# Patient Record
Sex: MALE | Race: BLACK OR AFRICAN AMERICAN | NOT HISPANIC OR LATINO | Employment: UNEMPLOYED | URBAN - METROPOLITAN AREA
[De-identification: names, ages, dates, MRNs, and addresses within clinical notes are randomized per-mention and may not be internally consistent; named-entity substitution may affect disease eponyms.]

---

## 2018-11-09 ENCOUNTER — HOSPITAL ENCOUNTER (EMERGENCY)
Facility: OTHER | Age: 48
Discharge: HOME | End: 2018-11-09
Attending: EMERGENCY MEDICINE
Payer: MEDICAID

## 2018-11-09 VITALS
RESPIRATION RATE: 15 BRPM | TEMPERATURE: 98 F | HEART RATE: 64 BPM | WEIGHT: 189 LBS | OXYGEN SATURATION: 98 % | DIASTOLIC BLOOD PRESSURE: 83 MMHG | BODY MASS INDEX: 29.66 KG/M2 | HEIGHT: 67 IN | SYSTOLIC BLOOD PRESSURE: 154 MMHG

## 2018-11-09 DIAGNOSIS — R07.9 CHEST PAIN: ICD-10-CM

## 2018-11-09 DIAGNOSIS — R06.6 INTRACTABLE HICCUPS: Primary | ICD-10-CM

## 2018-11-09 LAB
ALBUMIN SERPL BCP-MCNC: 3.8 G/DL
ALP SERPL-CCNC: 82 U/L
ALT SERPL W/O P-5'-P-CCNC: 22 U/L
ANION GAP SERPL CALC-SCNC: 12 MMOL/L
AST SERPL-CCNC: 38 U/L
BASOPHILS # BLD AUTO: 0.01 K/UL
BASOPHILS NFR BLD: 0.1 %
BILIRUB SERPL-MCNC: 0.4 MG/DL
BNP SERPL-MCNC: <10 PG/ML
BUN SERPL-MCNC: 8 MG/DL
CALCIUM SERPL-MCNC: 9.2 MG/DL
CHLORIDE SERPL-SCNC: 104 MMOL/L
CO2 SERPL-SCNC: 22 MMOL/L
CREAT SERPL-MCNC: 0.9 MG/DL
DIFFERENTIAL METHOD: ABNORMAL
EOSINOPHIL # BLD AUTO: 0.1 K/UL
EOSINOPHIL NFR BLD: 0.8 %
ERYTHROCYTE [DISTWIDTH] IN BLOOD BY AUTOMATED COUNT: 14.6 %
EST. GFR  (AFRICAN AMERICAN): >60 ML/MIN/1.73 M^2
EST. GFR  (NON AFRICAN AMERICAN): >60 ML/MIN/1.73 M^2
GLUCOSE SERPL-MCNC: 85 MG/DL
HCT VFR BLD AUTO: 42.7 %
HGB BLD-MCNC: 14.2 G/DL
LYMPHOCYTES # BLD AUTO: 1.6 K/UL
LYMPHOCYTES NFR BLD: 20.6 %
MCH RBC QN AUTO: 29.2 PG
MCHC RBC AUTO-ENTMCNC: 33.3 G/DL
MCV RBC AUTO: 88 FL
MONOCYTES # BLD AUTO: 1.1 K/UL
MONOCYTES NFR BLD: 13.3 %
NEUTROPHILS # BLD AUTO: 5.1 K/UL
NEUTROPHILS NFR BLD: 64.9 %
PLATELET # BLD AUTO: 206 K/UL
PMV BLD AUTO: 11.4 FL
POTASSIUM SERPL-SCNC: 3.7 MMOL/L
PROT SERPL-MCNC: 7.2 G/DL
RBC # BLD AUTO: 4.86 M/UL
SODIUM SERPL-SCNC: 138 MMOL/L
TROPONIN I SERPL DL<=0.01 NG/ML-MCNC: <0.006 NG/ML
WBC # BLD AUTO: 7.91 K/UL

## 2018-11-09 PROCEDURE — 96375 TX/PRO/DX INJ NEW DRUG ADDON: CPT

## 2018-11-09 PROCEDURE — 80053 COMPREHEN METABOLIC PANEL: CPT

## 2018-11-09 PROCEDURE — 84484 ASSAY OF TROPONIN QUANT: CPT

## 2018-11-09 PROCEDURE — 63600175 PHARM REV CODE 636 W HCPCS: Performed by: EMERGENCY MEDICINE

## 2018-11-09 PROCEDURE — 85025 COMPLETE CBC W/AUTO DIFF WBC: CPT

## 2018-11-09 PROCEDURE — 83880 ASSAY OF NATRIURETIC PEPTIDE: CPT

## 2018-11-09 PROCEDURE — 96374 THER/PROPH/DIAG INJ IV PUSH: CPT

## 2018-11-09 PROCEDURE — 93005 ELECTROCARDIOGRAM TRACING: CPT

## 2018-11-09 PROCEDURE — 99285 EMERGENCY DEPT VISIT HI MDM: CPT | Mod: 25

## 2018-11-09 PROCEDURE — 93010 ELECTROCARDIOGRAM REPORT: CPT | Mod: ,,, | Performed by: INTERNAL MEDICINE

## 2018-11-09 PROCEDURE — 25000003 PHARM REV CODE 250: Performed by: EMERGENCY MEDICINE

## 2018-11-09 RX ORDER — METOCLOPRAMIDE HYDROCHLORIDE 5 MG/ML
10 INJECTION INTRAMUSCULAR; INTRAVENOUS
Status: COMPLETED | OUTPATIENT
Start: 2018-11-09 | End: 2018-11-09

## 2018-11-09 RX ORDER — HYDRALAZINE HYDROCHLORIDE 100 MG/1
100 TABLET, FILM COATED ORAL 2 TIMES DAILY
COMMUNITY
End: 2021-08-23 | Stop reason: CLARIF

## 2018-11-09 RX ORDER — FENTANYL CITRATE 50 UG/ML
50 INJECTION, SOLUTION INTRAMUSCULAR; INTRAVENOUS
Status: COMPLETED | OUTPATIENT
Start: 2018-11-09 | End: 2018-11-09

## 2018-11-09 RX ORDER — METHOCARBAMOL 750 MG/1
500 TABLET, FILM COATED ORAL 4 TIMES DAILY
COMMUNITY
End: 2021-08-23 | Stop reason: CLARIF

## 2018-11-09 RX ORDER — CHLORPROMAZINE HYDROCHLORIDE 25 MG/1
25 TABLET, FILM COATED ORAL
Status: COMPLETED | OUTPATIENT
Start: 2018-11-09 | End: 2018-11-09

## 2018-11-09 RX ORDER — FUROSEMIDE 80 MG/1
80 TABLET ORAL 2 TIMES DAILY
COMMUNITY
End: 2021-08-23 | Stop reason: CLARIF

## 2018-11-09 RX ORDER — SPIRONOLACTONE 25 MG/1
25 TABLET ORAL DAILY
COMMUNITY
End: 2021-08-23 | Stop reason: CLARIF

## 2018-11-09 RX ORDER — ATORVASTATIN CALCIUM 40 MG/1
40 TABLET, FILM COATED ORAL DAILY
COMMUNITY
End: 2021-09-02 | Stop reason: SDUPTHER

## 2018-11-09 RX ORDER — FUROSEMIDE 40 MG/1
40 TABLET ORAL 2 TIMES DAILY
COMMUNITY
End: 2021-08-23 | Stop reason: CLARIF

## 2018-11-09 RX ORDER — ASPIRIN 81 MG/1
81 TABLET ORAL DAILY
COMMUNITY
End: 2021-08-23 | Stop reason: CLARIF

## 2018-11-09 RX ORDER — TRAMADOL HYDROCHLORIDE 50 MG/1
50 TABLET ORAL EVERY 6 HOURS
COMMUNITY
End: 2021-08-23 | Stop reason: CLARIF

## 2018-11-09 RX ORDER — CARVEDILOL 12.5 MG/1
12.5 TABLET ORAL 2 TIMES DAILY WITH MEALS
COMMUNITY
End: 2021-09-02 | Stop reason: SDUPTHER

## 2018-11-09 RX ORDER — BACLOFEN 10 MG/1
5 TABLET ORAL 2 TIMES DAILY PRN
Qty: 15 TABLET | Refills: 0 | Status: SHIPPED | OUTPATIENT
Start: 2018-11-09 | End: 2021-08-23 | Stop reason: CLARIF

## 2018-11-09 RX ADMIN — CHLORPROMAZINE HYDROCHLORIDE 25 MG: 25 TABLET, SUGAR COATED ORAL at 10:11

## 2018-11-09 RX ADMIN — BACLOFEN 5 MG: 10 TABLET ORAL at 10:11

## 2018-11-09 RX ADMIN — METOCLOPRAMIDE 10 MG: 5 INJECTION, SOLUTION INTRAMUSCULAR; INTRAVENOUS at 08:11

## 2018-11-09 RX ADMIN — FENTANYL CITRATE 50 MCG: 50 INJECTION, SOLUTION INTRAMUSCULAR; INTRAVENOUS at 08:11

## 2018-11-10 NOTE — ED TRIAGE NOTES
"Pt presents to the ed via [facility] with c/o cp x 6 hours PTA. Pt states, "I have been having chest hiccups for 5 days and the pain started today." Pt also reports n/v/ x 2. Pt has hx of CHF, denies sob, HA, dizziness, fever, chills. Pt attached to cardiac monitoring, continuous pulse ox, and BP cycling. Pt is aao, RR even and unlabored. VS stable. NAD noted. Will continue to monitor.  "

## 2018-11-10 NOTE — ED PROVIDER NOTES
"Encounter Date: 11/9/2018    SCRIBE #1 NOTE: I, Brittany Grier, vickie scribing for, and in the presence of, Dr. Mcgovern.       History     Chief Complaint   Patient presents with    Chest Pain     Pt came to the ED tonight c.o. chest pain x 6 hours. pt has a hx of chf htn.      Time seen by provider: 8:07 PM    This is a 48 y.o. male with hx of HTN, A-Fib, and CHF who presents via EMS with complaint of R sided chest pain for 6 hours. Pt was walking at onset. It is exacerbated with deep inspiration, movement, and ambulation. He also reports "non-stop" hiccups for the past 5 days. He had partial R lobectomy at [facility] 4 months ago for tumor. He reports intermittent chest pains and hiccups since, but not as severe or constant as current sx. He has had numbness to the R side of the torso since surgery. Of note, pt retained about 60lbs of fluid after surgery, which he has since lost with lasix medicine adjustment. He was given NTG spray and ASA by EMS en route here, which pt states helped sx. He denies any fever, chills, N/V/D, abdominal pain, leg swelling, palpitations, SOB, cough, wheezing, dizziness, weakness, lightheadedness, and back pain. Patient was in CT surgery clinic for routine follow-up today but did not want to wait for MD so left Clinic and reschedule appointment for next month.    Per chart review of [facility] records, pt was diagnosed with a R lower lobe neuroendocrine carcinoid tumor in 2015. He was then diagnosed with new onset CHF and LV thrombus earlier this year and started on Coumadin. Pt had R lower lobe lobectomy on 7/24/28 with small post-op R pleural effusion. He has had multiple ED visits for intractable R CP at [facility] since then despite taking tramadol.      The history is provided by the patient.     Review of patient's allergies indicates:   Allergen Reactions    Ace inhibitors      Past Medical History:   Diagnosis Date    CHF (congestive heart failure)     Hypertension      History reviewed. " No pertinent surgical history.  History reviewed. No pertinent family history.  Social History     Tobacco Use    Smoking status: Current Every Day Smoker     Types: Cigarettes   Substance Use Topics    Alcohol use: Yes    Drug use: No     Review of Systems   Constitutional: Positive for unexpected weight change. Negative for chills and fever.   HENT: Negative for congestion, rhinorrhea and sore throat.    Eyes: Negative for visual disturbance.   Respiratory: Negative for cough and shortness of breath.    Cardiovascular: Positive for chest pain. Negative for palpitations and leg swelling.   Gastrointestinal: Negative for abdominal pain, diarrhea, nausea and vomiting.        Positive for hiccups.   Endocrine: Negative for polyuria.   Genitourinary: Negative for decreased urine volume and dysuria.   Musculoskeletal: Negative for back pain.   Skin: Negative for rash.   Allergic/Immunologic: Negative for immunocompromised state.   Neurological: Positive for numbness. Negative for dizziness, syncope, weakness, light-headedness and headaches.   Hematological: Does not bruise/bleed easily.   Psychiatric/Behavioral: Negative for confusion.       Physical Exam     Initial Vitals [11/09/18 1940]   BP Pulse Resp Temp SpO2   129/66 96 20 97.8 °F (36.6 °C) 97 %      MAP       --         Physical Exam    Nursing note and vitals reviewed.  Constitutional: He appears well-developed and well-nourished. He is not diaphoretic. No distress.   HENT:   Head: Normocephalic and atraumatic.   Right Ear: External ear normal.   Left Ear: External ear normal.   Eyes: Right eye exhibits no discharge. Left eye exhibits no discharge.   Neck: Normal range of motion. Neck supple.   Cardiovascular: Normal rate, regular rhythm, normal heart sounds and intact distal pulses. Exam reveals no gallop and no friction rub.    No murmur heard.  Pulmonary/Chest: No respiratory distress. He has no wheezes. He has no rhonchi. He has no rales. He exhibits  tenderness.   Decreased breath sounds R lower lung. Reproducible chest wall tenderness.    Abdominal: Soft. Bowel sounds are normal. He exhibits no distension. There is no tenderness. There is no rebound and no guarding.   Musculoskeletal: Normal range of motion. He exhibits no edema or tenderness.   Lymphadenopathy:     He has no cervical adenopathy.   Neurological: He is alert and oriented to person, place, and time. He has normal strength. No sensory deficit.   Skin: Skin is warm and dry. No rash noted. No erythema.   Psychiatric: He has a normal mood and affect. His behavior is normal. Judgment and thought content normal.         ED Course   Procedures  Labs Reviewed   CBC W/ AUTO DIFFERENTIAL - Abnormal; Notable for the following components:       Result Value    RDW 14.6 (*)     Mono # 1.1 (*)     All other components within normal limits   COMPREHENSIVE METABOLIC PANEL - Abnormal; Notable for the following components:    CO2 22 (*)     All other components within normal limits   B-TYPE NATRIURETIC PEPTIDE   TROPONIN I     EKG Readings: (Independently Interpreted)   ST at a rate of 101. Non specific T wave abnormality. No STEMI.       Imaging Results          X-Ray Chest PA And Lateral (Final result)  Result time 11/09/18 21:51:17    Final result by Stewart Lozano MD (11/09/18 21:51:17)                 Impression:      No radiographic acute intrathoracic process seen.      Electronically signed by: Stewart Lozano MD  Date:    11/09/2018  Time:    21:51             Narrative:    EXAMINATION:  XR CHEST PA AND LATERAL    CLINICAL HISTORY:  Chest pain, unspecified    TECHNIQUE:  PA and lateral views of the chest were performed.    COMPARISON:  None    FINDINGS:  Monitoring leads overlie the chest.  Cardiomediastinal silhouette is within normal limits.  Right hilar and mediastinal surgical clips noted.  There is blunting of the right costophrenic angle with mild chronic appearing deformity of right lateral 6th and  7th ribs, suggesting pleural thickening/scarring.  Slight volume loss at the right lung base, presumably related to prior surgery/trauma.  Left hemithorax is clear.  No pneumothorax.  No acute osseous process seen.                              X-Rays:   Independently Interpreted Readings:   Chest X-Ray: R pleural effusion. No PNA or CHF.      Medical Decision Making:   Initial Assessment:       48-year-old male with history of HTN, CHF, AFib/LV thrombus, right lower lobe lobectomy over 3 months ago for removal of carcinoid tumor at [facility], presents with intractable hiccups for 5 days and worsening right chest wall pain for the past 6 hr.  He has poor historian, but states that he has had this same chest pain on and off since the surgery, was previously getting pain pills but is no longer on them.  He also has had intermittent hiccups but never this persistent.  He was in clinic to see his CT surgeon today but left because he did not want to wait that long for MD, and called EMS who took him here.  Exam with reproducible right chest wall tenderness, and intermittent hiccups, but no sign peripheral edema or CHF exacerbation.  Very low suspicion for PE since no hypoxia or active SOB, and unlikely to be ACS given chronicity of pain. More likely hiccups from chronic right pleural effusion or scarring that is exacerbating chronic postop right chest pain.      Given risk factors, ACS workup done with negative troponin no other acute findings on labs, and no sign of CHF exacerbation.  Patient now admits that he has been noncompliant with all meds except for Lasix, including anticoagulation for LV thrombus.  He had persistent hiccups after Reglan, so was treated with baclofen and Thorazine with resolution.  Chest x-ray with right lower lobe scarring or small pleural effusion, similar to previous per [facility] records. Still no hypoxia or SOB on reassessment to warrant emergent workup for PE.  Per chart review, he has had some ED  visits at [facility] for intractable postop right chest pain, and was previously on tramadol.  He also has a history of alcohol abuse, and I am reluctant to give opiates for pain now.  Since baclofen was effective for his hiccups and his hiccups is likely cause of worsening pain, will Rx small amount of baclofen and encourage patient to reschedule CT surgery appointment for next week.  I also had extensive conversation with him about importance of restarting BP meds and anticoagulation, but he still is refusing.  He will return to the ED for any worsening pain or other concerns.      Independently Interpreted Test(s):   I have ordered and independently interpreted X-rays - see prior notes.  I have ordered and independently interpreted EKG Reading(s) - see prior notes  Clinical Tests:   Lab Tests: Ordered and Reviewed  Radiological Study: Ordered and Reviewed  Medical Tests: Ordered and Reviewed            Scribe Attestation:   Scribe #1: I performed the above scribed service and the documentation accurately describes the services I performed. I attest to the accuracy of the note.    Attending Attestation:           Physician Attestation for Scribe:  Physician Attestation Statement for Scribe #1: I, Dr. Mcgovern, reviewed documentation, as scribed by Brittany Grier in my presence, and it is both accurate and complete.                    Clinical Impression:     1. Intractable hiccups    2. Chest pain                                 Kwasi Mcgovern MD  11/09/18 [id number]

## 2018-11-10 NOTE — ED NOTES
Pt resting in bed comfortably, vs stable. Bed in the lowest locked position, side rails up x 2, call light within reach. NAD noted. Will continue to monitor.

## 2021-08-22 PROBLEM — I67.9 CVD (CEREBROVASCULAR DISEASE): Status: ACTIVE | Noted: 2021-08-22

## 2021-08-22 PROBLEM — E87.6 HYPOKALEMIA: Status: ACTIVE | Noted: 2021-08-22

## 2021-08-22 PROBLEM — I50.22 CHRONIC HFREF (HEART FAILURE WITH REDUCED EJECTION FRACTION): Status: ACTIVE | Noted: 2021-08-22

## 2021-08-22 PROBLEM — J18.9 PNEUMONIA INVOLVING RIGHT LUNG: Status: ACTIVE | Noted: 2021-08-22

## 2021-08-22 PROBLEM — R93.89 ABNORMAL CT OF THE CHEST: Status: ACTIVE | Noted: 2021-08-22

## 2021-08-22 PROBLEM — J18.9 PNEUMONIA DUE TO INFECTIOUS ORGANISM: Status: ACTIVE | Noted: 2021-08-22

## 2021-08-22 PROBLEM — I25.10 CAD (CORONARY ARTERY DISEASE): Status: ACTIVE | Noted: 2021-08-22

## 2021-08-22 PROBLEM — D72.9 NEUTROPHILIC LEUKOCYTOSIS: Status: ACTIVE | Noted: 2021-08-22

## 2021-08-22 PROBLEM — C34.91: Status: ACTIVE | Noted: 2021-08-22
